# Patient Record
Sex: FEMALE | Race: WHITE
[De-identification: names, ages, dates, MRNs, and addresses within clinical notes are randomized per-mention and may not be internally consistent; named-entity substitution may affect disease eponyms.]

---

## 2018-01-25 ENCOUNTER — HOSPITAL ENCOUNTER (EMERGENCY)
Dept: HOSPITAL 96 - M.ERS | Age: 28
Discharge: HOME | End: 2018-01-25
Payer: COMMERCIAL

## 2018-01-25 VITALS — HEIGHT: 67 IN | BODY MASS INDEX: 28.25 KG/M2 | WEIGHT: 180 LBS

## 2018-01-25 VITALS — SYSTOLIC BLOOD PRESSURE: 126 MMHG | DIASTOLIC BLOOD PRESSURE: 84 MMHG

## 2018-01-25 DIAGNOSIS — F17.210: ICD-10-CM

## 2018-01-25 DIAGNOSIS — Z90.89: ICD-10-CM

## 2018-01-25 DIAGNOSIS — Z91.040: ICD-10-CM

## 2018-01-25 DIAGNOSIS — Z90.49: ICD-10-CM

## 2018-01-25 DIAGNOSIS — R10.2: Primary | ICD-10-CM

## 2018-01-25 DIAGNOSIS — J45.909: ICD-10-CM

## 2018-01-25 DIAGNOSIS — N80.9: ICD-10-CM

## 2018-01-25 LAB
ABSOLUTE BASOPHILS: 0 THOU/UL (ref 0–0.2)
ABSOLUTE EOSINOPHILS: 0.2 THOU/UL (ref 0–0.7)
ABSOLUTE MONOCYTES: 0.5 THOU/UL (ref 0–1.2)
ALBUMIN SERPL-MCNC: 3.7 G/DL (ref 3.4–5)
ALP SERPL-CCNC: 77 U/L (ref 46–116)
ALT SERPL-CCNC: 22 U/L (ref 30–65)
ANION GAP SERPL CALC-SCNC: 4 MMOL/L (ref 7–16)
AST SERPL-CCNC: 14 U/L (ref 15–37)
BASOPHILS NFR BLD AUTO: 0.8 %
BILIRUB SERPL-MCNC: 0.1 MG/DL
BILIRUB UR-MCNC: NEGATIVE MG/DL
BUN SERPL-MCNC: 14 MG/DL (ref 7–18)
CALCIUM SERPL-MCNC: 9.3 MG/DL (ref 8.5–10.1)
CHLORIDE SERPL-SCNC: 106 MMOL/L (ref 98–107)
CO2 SERPL-SCNC: 30 MMOL/L (ref 21–32)
COLOR UR: YELLOW
CREAT SERPL-MCNC: 0.8 MG/DL (ref 0.6–1.3)
EOSINOPHIL NFR BLD: 4 %
GLUCOSE SERPL-MCNC: 102 MG/DL (ref 70–99)
GRANULOCYTES NFR BLD MANUAL: 42.8 %
HCT VFR BLD CALC: 42.7 % (ref 37–47)
HGB BLD-MCNC: 14.3 GM/DL (ref 12–15)
KETONES UR STRIP-MCNC: NEGATIVE MG/DL
LIPASE: 162 U/L (ref 73–393)
LYMPHOCYTES # BLD: 2.5 THOU/UL (ref 0.8–5.3)
LYMPHOCYTES NFR BLD AUTO: 43.3 %
MCH RBC QN AUTO: 30.3 PG (ref 26–34)
MCHC RBC AUTO-ENTMCNC: 33.5 G/DL (ref 28–37)
MCV RBC: 90.5 FL (ref 80–100)
MONOCYTES NFR BLD: 9.1 %
MPV: 10.7 FL. (ref 7.2–11.1)
NEUTROPHILS # BLD: 2.5 THOU/UL (ref 1.6–8.1)
NITRITE UR QL STRIP: NEGATIVE
NUCLEATED RBCS: 0 /100WBC
PLATELET COUNT*: 192 THOU/UL (ref 150–400)
POTASSIUM SERPL-SCNC: 3.8 MMOL/L (ref 3.5–5.1)
PROT SERPL-MCNC: 6.9 G/DL (ref 6.4–8.2)
PROT UR QL STRIP: NEGATIVE
RBC # BLD AUTO: 4.72 MIL/UL (ref 4.2–5)
RBC # UR STRIP: (no result) /UL
RBC #/AREA URNS HPF: (no result) /HPF (ref 0–2)
RDW-CV: 13.2 % (ref 10.5–14.5)
SODIUM SERPL-SCNC: 140 MMOL/L (ref 136–145)
SP GR UR STRIP: >= 1.03 (ref 1–1.03)
SQUAMOUS: (no result) /LPF (ref 0–3)
URINE CLARITY: CLEAR
URINE GLUCOSE-RANDOM: NEGATIVE
URINE LEUKOCYTES: NEGATIVE
UROBILINOGEN UR STRIP-ACNC: 0.2 E.U./DL (ref 0.2–1)
WBC # BLD AUTO: 5.9 THOU/UL (ref 4–11)

## 2018-03-05 ENCOUNTER — HOSPITAL ENCOUNTER (EMERGENCY)
Dept: HOSPITAL 96 - M.ERS | Age: 28
Discharge: HOME | End: 2018-03-05
Payer: COMMERCIAL

## 2018-03-05 VITALS — DIASTOLIC BLOOD PRESSURE: 65 MMHG | SYSTOLIC BLOOD PRESSURE: 112 MMHG

## 2018-03-05 VITALS — WEIGHT: 180.01 LBS | BODY MASS INDEX: 28.25 KG/M2 | HEIGHT: 67 IN

## 2018-03-05 DIAGNOSIS — Z91.040: ICD-10-CM

## 2018-03-05 DIAGNOSIS — Z91.09: ICD-10-CM

## 2018-03-05 DIAGNOSIS — O26.891: Primary | ICD-10-CM

## 2018-03-05 DIAGNOSIS — R10.9: ICD-10-CM

## 2018-03-05 DIAGNOSIS — Z90.49: ICD-10-CM

## 2018-03-05 DIAGNOSIS — F41.9: ICD-10-CM

## 2018-03-05 DIAGNOSIS — R07.89: ICD-10-CM

## 2018-03-05 DIAGNOSIS — F17.210: ICD-10-CM

## 2018-03-05 DIAGNOSIS — Z3A.01: ICD-10-CM

## 2018-03-05 DIAGNOSIS — O99.341: ICD-10-CM

## 2018-03-05 LAB
ABSOLUTE BASOPHILS: 0.1 THOU/UL (ref 0–0.2)
ABSOLUTE EOSINOPHILS: 0.1 THOU/UL (ref 0–0.7)
ABSOLUTE MONOCYTES: 0.5 THOU/UL (ref 0–1.2)
ALBUMIN SERPL-MCNC: 3.8 G/DL (ref 3.4–5)
ALP SERPL-CCNC: 61 U/L (ref 46–116)
ALT SERPL-CCNC: 31 U/L (ref 30–65)
ANION GAP SERPL CALC-SCNC: 10 MMOL/L (ref 7–16)
AST SERPL-CCNC: 18 U/L (ref 15–37)
BASOPHILS NFR BLD AUTO: 0.8 %
BILIRUB SERPL-MCNC: 0.2 MG/DL
BILIRUB UR-MCNC: NEGATIVE MG/DL
BUN SERPL-MCNC: 9 MG/DL (ref 7–18)
CALCIUM SERPL-MCNC: 8.4 MG/DL (ref 8.5–10.1)
CHLORIDE SERPL-SCNC: 106 MMOL/L (ref 98–107)
CO2 SERPL-SCNC: 23 MMOL/L (ref 21–32)
COLOR UR: YELLOW
CREAT SERPL-MCNC: 0.6 MG/DL (ref 0.6–1.3)
EOSINOPHIL NFR BLD: 2 %
GLUCOSE SERPL-MCNC: 88 MG/DL (ref 70–99)
GRANULOCYTES NFR BLD MANUAL: 52.5 %
HCT VFR BLD CALC: 40.8 % (ref 37–47)
HGB BLD-MCNC: 13.6 GM/DL (ref 12–15)
KETONES UR STRIP-MCNC: (no result) MG/DL
LYMPHOCYTES # BLD: 2.4 THOU/UL (ref 0.8–5.3)
LYMPHOCYTES NFR BLD AUTO: 37.6 %
MCH RBC QN AUTO: 30.1 PG (ref 26–34)
MCHC RBC AUTO-ENTMCNC: 33.4 G/DL (ref 28–37)
MCV RBC: 90 FL (ref 80–100)
MONOCYTES NFR BLD: 7.1 %
MPV: 10.3 FL. (ref 7.2–11.1)
NEUTROPHILS # BLD: 3.4 THOU/UL (ref 1.6–8.1)
NUCLEATED RBCS: 0 /100WBC
PLATELET COUNT*: 199 THOU/UL (ref 150–400)
POTASSIUM SERPL-SCNC: 3.5 MMOL/L (ref 3.5–5.1)
PROT SERPL-MCNC: 6.7 G/DL (ref 6.4–8.2)
PROT UR QL STRIP: NEGATIVE
RBC # BLD AUTO: 4.53 MIL/UL (ref 4.2–5)
RBC # UR STRIP: NEGATIVE /UL
RDW-CV: 13.4 % (ref 10.5–14.5)
SODIUM SERPL-SCNC: 139 MMOL/L (ref 136–145)
SP GR UR STRIP: 1.02 (ref 1–1.03)
TROPONIN-I LEVEL: <0.06 NG/ML (ref ?–0.06)
URINE CLARITY: CLEAR
URINE GLUCOSE-RANDOM: NEGATIVE
URINE LEUKOCYTES-REFLEX: NEGATIVE
URINE NITRITE-REFLEX: NEGATIVE
UROBILINOGEN UR STRIP-ACNC: 0.2 E.U./DL (ref 0.2–1)
WBC # BLD AUTO: 6.5 THOU/UL (ref 4–11)

## 2019-01-09 ENCOUNTER — HOSPITAL ENCOUNTER (EMERGENCY)
Dept: HOSPITAL 96 - M.ERS | Age: 29
Discharge: HOME | End: 2019-01-09
Payer: COMMERCIAL

## 2019-01-09 VITALS — BODY MASS INDEX: 27.78 KG/M2 | WEIGHT: 177.01 LBS | HEIGHT: 67 IN

## 2019-01-09 VITALS — SYSTOLIC BLOOD PRESSURE: 135 MMHG | DIASTOLIC BLOOD PRESSURE: 88 MMHG

## 2019-01-09 DIAGNOSIS — Z91.040: ICD-10-CM

## 2019-01-09 DIAGNOSIS — F17.210: ICD-10-CM

## 2019-01-09 DIAGNOSIS — Z88.8: ICD-10-CM

## 2019-01-09 DIAGNOSIS — R42: ICD-10-CM

## 2019-01-09 DIAGNOSIS — N80.9: ICD-10-CM

## 2019-01-09 DIAGNOSIS — Z90.49: ICD-10-CM

## 2019-01-09 DIAGNOSIS — N13.2: Primary | ICD-10-CM

## 2019-01-09 LAB
ABSOLUTE BASOPHILS: 0 THOU/UL (ref 0–0.2)
ABSOLUTE EOSINOPHILS: 0.2 THOU/UL (ref 0–0.7)
ABSOLUTE MONOCYTES: 0.4 THOU/UL (ref 0–1.2)
ALBUMIN SERPL-MCNC: 3.5 G/DL (ref 3.4–5)
ALP SERPL-CCNC: 78 U/L (ref 46–116)
ALT SERPL-CCNC: 49 U/L (ref 30–65)
ANION GAP SERPL CALC-SCNC: 11 MMOL/L (ref 7–16)
AST SERPL-CCNC: 19 U/L (ref 15–37)
BASOPHILS NFR BLD AUTO: 0.5 %
BILIRUB SERPL-MCNC: 0.2 MG/DL
BILIRUB UR-MCNC: NEGATIVE MG/DL
BUN SERPL-MCNC: 10 MG/DL (ref 7–18)
CALCIUM SERPL-MCNC: 8.7 MG/DL (ref 8.5–10.1)
CHLORIDE SERPL-SCNC: 104 MMOL/L (ref 98–107)
CO2 SERPL-SCNC: 28 MMOL/L (ref 21–32)
COLOR UR: YELLOW
CREAT SERPL-MCNC: 0.8 MG/DL (ref 0.6–1.3)
EOSINOPHIL NFR BLD: 2.9 %
GLUCOSE SERPL-MCNC: 81 MG/DL (ref 70–99)
GRANULOCYTES NFR BLD MANUAL: 51 %
HCT VFR BLD CALC: 38.7 % (ref 37–47)
HGB BLD-MCNC: 13 GM/DL (ref 12–15)
KETONES UR STRIP-MCNC: NEGATIVE MG/DL
LIPASE: 106 U/L (ref 73–393)
LYMPHOCYTES # BLD: 2.1 THOU/UL (ref 0.8–5.3)
LYMPHOCYTES NFR BLD AUTO: 38.5 %
MCH RBC QN AUTO: 30.1 PG (ref 26–34)
MCHC RBC AUTO-ENTMCNC: 33.7 G/DL (ref 28–37)
MCV RBC: 89.3 FL (ref 80–100)
MONOCYTES NFR BLD: 7.1 %
MPV: 10.3 FL. (ref 7.2–11.1)
NEUTROPHILS # BLD: 2.8 THOU/UL (ref 1.6–8.1)
NUCLEATED RBCS: 0 /100WBC
PLATELET COUNT*: 204 THOU/UL (ref 150–400)
POTASSIUM SERPL-SCNC: 4.1 MMOL/L (ref 3.5–5.1)
PROT SERPL-MCNC: 6.3 G/DL (ref 6.4–8.2)
PROT UR QL STRIP: NEGATIVE
RBC # BLD AUTO: 4.33 MIL/UL (ref 4.2–5)
RBC # UR STRIP: (no result) /UL
RDW-CV: 15.2 % (ref 10.5–14.5)
SODIUM SERPL-SCNC: 143 MMOL/L (ref 136–145)
SP GR UR STRIP: 1.01 (ref 1–1.03)
SQUAMOUS: (no result) /LPF (ref 0–3)
URINE CLARITY: CLEAR
URINE GLUCOSE-RANDOM: NEGATIVE
URINE LEUKOCYTES-REFLEX: NEGATIVE
URINE NITRITE-REFLEX: NEGATIVE
UROBILINOGEN UR STRIP-ACNC: 0.2 E.U./DL (ref 0.2–1)
WBC # BLD AUTO: 5.5 THOU/UL (ref 4–11)

## 2019-01-10 NOTE — CON
[address]  [address]                    CONSULTATION                  
_______________________________________________________________________________
 
Name:       CORNELIUSANABELLE CLIFF              Room:                      [facility]  
KIERA#:  [id number]      Account #:      [id number]  
Admission:  01/09/19     Attend Phys:                         
Discharge:  01/09/19     Date of Birth:  [birth year]  
         Report #: [id number]
                                                                     [id number]  
_______________________________________________________________________________
THIS REPORT FOR:  //name//                      
 
CC: Kaye Buchanan
 
DATE OF SERVICE:  01/09/2019
 
 
REASON FOR CONSULTATION:  Right flank pain, status post ESWL.
 
HISTORY OF PRESENT ILLNESS:  The patient is a very pleasant 28-year-old female
who underwent ESWL treatment for a right proximal ureteral stone yesterday by
Dr. Moreira at [facility].  She presented to the Emergency Room today with
intractable right flank pain.  She said she almost passed out as she was making
a bottle for her 2-month-old baby due to pain.  She denies any fevers or chills.
 She has felt lightheaded with nausea, but has not vomited.  She also reports
trouble urinating.  CT scan was done in the ER, which shows an 8 mm right
proximal ureteral stone.  There is no perinephric hematoma and there are no
distal fragments.  The stone does look a little more linear, so it may be that
the stone has broken up, but it is just hard to tell.
 
PAST MEDICAL HISTORY:  Include kidney stones, ovarian cyst, endometriosis.
 
PAST SURGICAL HISTORY:  Includes cholecystectomy, tubal ligation and right
shoulder surgery as well as the recent ESWL treatment.
 
MEDICATIONS:  At home include prenatal vitamins, sertraline and Norco.
 
ALLERGIES:  ADHESIVE TAPE AND LATEX.
 
SOCIAL HISTORY:  The patient lives with her .  She smokes a pack a day. 
Denies alcohol use or current recreational drug use.
 
FAMILY HISTORY:  Noncontributory.
 
REVIEW OF SYSTEMS:  Twelve-point review of systems is performed and is negative
except as noted above in the HPI.
 
PHYSICAL EXAMINATION:
VITAL SIGNS:  Her temperature is 36.7, pulse 91, respirations 18, blood pressure
138/94, pulse ox 99% on room air.
GENERAL:  She is a well-developed, well-nourished white female, in no acute
distress.  She is alert and oriented and appears nontoxic.
HEENT:  Normocephalic, atraumatic.
RESPIRATIONS:  Unlabored.
HEART:  Regular.
 
 
 
[place] [address]                    CONSULTATION                  
_______________________________________________________________________________
 
Name:       ANABELLE SHIELDS              Room:                      [facility]#:  [id number]      Account #:      [id number]  
Admission:  01/09/19     Attend Phys:                         
Discharge:  01/09/19     Date of Birth:  [birth year]  
         Report #: [id number]
                                                                     [id number]  
_______________________________________________________________________________
ABDOMEN:  Soft, nontender, nondistended.
BACK:  She has some right CVA tenderness.
EXTREMITIES:  Without edema.
 
LABORATORY DATA:  White count is 5.5.  She has normal hemoglobin and platelet
count.  Her BMP is normal with a BUN of 10, creatinine 0.8.  Urinalysis showed
just some blood, otherwise unremarkable.  CT scan was reviewed and reveals an 8
mm right proximal obstructing stone.  She has no hematoma from the lithotripsy.
 
ASSESSMENT AND PLAN:  Right proximal 8 mm ureteral stone, status post
extracorporeal shock wave lithotripsy.  She was seen in the ER today with
intractable flank pain.  She is feeling a lot better.  She has a 2-month-old
baby at home and wants to be discharged.  Now that her pain is under control, we
are going to switch her to oxycodone instead of the hydrocodone to see if that
helps with home pain management a little better.  She does understand the return
precautions of coming back to the Emergency Room if she again has intractable
pain or fever especially.  She is going to continue to strain her urine and okay
to start on Flomax since she is pumping and dumping currently.  She does have
followup to see Dr. Vickers actually on 01/15/2019 for postop appointment.  We
did discuss if she ends up with intractable pain from this, she will probably
require ureteroscopic intervention.
 
 
 
 
 
 
 
 
 
 
 
 
 
 
 
 
 
 
 
 
 
 
 
<ELECTRONICALLY SIGNED>
                                        By:  Kimberly Hernandez MD      
01/10/19     1540
D: 01/09/19 1530_______________________________________
T: 01/10/19 0045Kimberly Hernandez MD         /nt

## 2019-01-19 ENCOUNTER — HOSPITAL ENCOUNTER (INPATIENT)
Dept: HOSPITAL 96 - M.ERS | Age: 29
LOS: 2 days | Discharge: HOME | DRG: 700 | End: 2019-01-21
Attending: INTERNAL MEDICINE | Admitting: INTERNAL MEDICINE
Payer: COMMERCIAL

## 2019-01-19 VITALS — DIASTOLIC BLOOD PRESSURE: 98 MMHG | SYSTOLIC BLOOD PRESSURE: 136 MMHG

## 2019-01-19 VITALS — WEIGHT: 177 LBS | HEIGHT: 67.01 IN | BODY MASS INDEX: 27.78 KG/M2

## 2019-01-19 DIAGNOSIS — Z91.048: ICD-10-CM

## 2019-01-19 DIAGNOSIS — Z79.899: ICD-10-CM

## 2019-01-19 DIAGNOSIS — N39.0: ICD-10-CM

## 2019-01-19 DIAGNOSIS — Z87.442: ICD-10-CM

## 2019-01-19 DIAGNOSIS — Z90.49: ICD-10-CM

## 2019-01-19 DIAGNOSIS — Z91.040: ICD-10-CM

## 2019-01-19 DIAGNOSIS — F17.210: ICD-10-CM

## 2019-01-19 DIAGNOSIS — Z88.5: ICD-10-CM

## 2019-01-19 DIAGNOSIS — J45.909: ICD-10-CM

## 2019-01-19 DIAGNOSIS — N28.89: Primary | ICD-10-CM

## 2019-01-19 LAB
ABSOLUTE BASOPHILS: 0.1 THOU/UL (ref 0–0.2)
ABSOLUTE EOSINOPHILS: 0.3 THOU/UL (ref 0–0.7)
ABSOLUTE MONOCYTES: 0.5 THOU/UL (ref 0–1.2)
ALBUMIN SERPL-MCNC: 3.5 G/DL (ref 3.4–5)
ALP SERPL-CCNC: 99 U/L (ref 46–116)
ALT SERPL-CCNC: 38 U/L (ref 30–65)
ANION GAP SERPL CALC-SCNC: 10 MMOL/L (ref 7–16)
AST SERPL-CCNC: 32 U/L (ref 15–37)
BASOPHILS NFR BLD AUTO: 0.7 %
BILIRUB SERPL-MCNC: 0.2 MG/DL
BILIRUB UR-MCNC: NEGATIVE MG/DL
BUN SERPL-MCNC: 25 MG/DL (ref 7–18)
CALCIUM SERPL-MCNC: 8.8 MG/DL (ref 8.5–10.1)
CHLORIDE SERPL-SCNC: 101 MMOL/L (ref 98–107)
CO2 SERPL-SCNC: 25 MMOL/L (ref 21–32)
COLOR UR: (no result)
CREAT SERPL-MCNC: 0.8 MG/DL (ref 0.6–1.3)
EOSINOPHIL NFR BLD: 4 %
GLUCOSE SERPL-MCNC: 108 MG/DL (ref 70–99)
GRANULOCYTES NFR BLD MANUAL: 47.3 %
HCT VFR BLD CALC: 39.3 % (ref 37–47)
HGB BLD-MCNC: 13.3 GM/DL (ref 12–15)
KETONES UR STRIP-MCNC: NEGATIVE MG/DL
LIPASE: 121 U/L (ref 73–393)
LYMPHOCYTES # BLD: 3 THOU/UL (ref 0.8–5.3)
LYMPHOCYTES NFR BLD AUTO: 41.1 %
MCH RBC QN AUTO: 29.7 PG (ref 26–34)
MCHC RBC AUTO-ENTMCNC: 33.8 G/DL (ref 28–37)
MCV RBC: 87.9 FL (ref 80–100)
MONOCYTES NFR BLD: 6.9 %
MPV: 10.5 FL. (ref 7.2–11.1)
NEUTROPHILS # BLD: 3.5 THOU/UL (ref 1.6–8.1)
NUCLEATED RBCS: 0 /100WBC
PLATELET COUNT*: 239 THOU/UL (ref 150–400)
POTASSIUM SERPL-SCNC: 4.7 MMOL/L (ref 3.5–5.1)
PROT SERPL-MCNC: 6.9 G/DL (ref 6.4–8.2)
PROT UR QL STRIP: (no result)
RBC # BLD AUTO: 4.47 MIL/UL (ref 4.2–5)
RBC # UR STRIP: (no result) /UL
RBC #/AREA URNS HPF: (no result) /HPF (ref 0–2)
RDW-CV: 14.9 % (ref 10.5–14.5)
SODIUM SERPL-SCNC: 136 MMOL/L (ref 136–145)
SP GR UR STRIP: >= 1.03 (ref 1–1.03)
SQUAMOUS: (no result) /LPF (ref 0–3)
URINE CLARITY: (no result)
URINE GLUCOSE-RANDOM: NEGATIVE
URINE LEUKOCYTES-REFLEX: (no result)
URINE NITRITE-REFLEX: NEGATIVE
URINE WBC-REFLEX: (no result) /HPF (ref 0–5)
UROBILINOGEN UR STRIP-ACNC: 0.2 E.U./DL (ref 0.2–1)
WBC # BLD AUTO: 7.4 THOU/UL (ref 4–11)

## 2019-01-20 VITALS — SYSTOLIC BLOOD PRESSURE: 125 MMHG | DIASTOLIC BLOOD PRESSURE: 84 MMHG

## 2019-01-20 VITALS — SYSTOLIC BLOOD PRESSURE: 111 MMHG | DIASTOLIC BLOOD PRESSURE: 72 MMHG

## 2019-01-20 VITALS — DIASTOLIC BLOOD PRESSURE: 85 MMHG | SYSTOLIC BLOOD PRESSURE: 136 MMHG

## 2019-01-20 VITALS — DIASTOLIC BLOOD PRESSURE: 63 MMHG | SYSTOLIC BLOOD PRESSURE: 120 MMHG

## 2019-01-20 VITALS — DIASTOLIC BLOOD PRESSURE: 58 MMHG | SYSTOLIC BLOOD PRESSURE: 118 MMHG

## 2019-01-20 NOTE — NUR
PATIENT REMAINS ALERT AND ORIENTED. PATIENT REQUESTED HYDROCODONE FOR PAIN
RELIEF. HYDROCODONE AND FLEXERIL USED FOR RIGHT FLANK PAIN. ZOFRAN X1 FOR
NAUSEA. NO EMESIS NOTED. UP AD KATLYN. EATING SMALL AMOUNTS OF MEALS. GOOD FLUID
INTAKE. IVF INFUSING AS ORDERED. PATIENT BREASTFEEDING. CALL LIGHT WITHIN
REACH. WILL CONTINUE TO MONITOR.

## 2019-01-20 NOTE — NUR
PT ARRIVED ON UNIT AT 0125 FROM ED. VITALS STABLE. ADMIT COMPLETE. IV R HAND
PATENT, INFUSING. UROLOGY CONSULTED. ON REGULAR DIET. PAIN CONTROLLED WITH
FENTANYL. PT SETTLED AND SLEEPING. UP AD KATLYN. HOURLY ROUNDING COMPLETE. CALL
LIGHT WITHIN REACH. WILL CONTINUE TO MONITITOR.

## 2019-01-21 VITALS — SYSTOLIC BLOOD PRESSURE: 111 MMHG | DIASTOLIC BLOOD PRESSURE: 68 MMHG

## 2019-01-21 LAB
ALBUMIN SERPL-MCNC: 2.9 G/DL (ref 3.4–5)
ALP SERPL-CCNC: 84 U/L (ref 46–116)
ALT SERPL-CCNC: 31 U/L (ref 30–65)
ANION GAP SERPL CALC-SCNC: 5 MMOL/L (ref 7–16)
AST SERPL-CCNC: 14 U/L (ref 15–37)
BILIRUB SERPL-MCNC: 0.1 MG/DL
BUN SERPL-MCNC: 15 MG/DL (ref 7–18)
CALCIUM SERPL-MCNC: 8.4 MG/DL (ref 8.5–10.1)
CHLORIDE SERPL-SCNC: 108 MMOL/L (ref 98–107)
CO2 SERPL-SCNC: 27 MMOL/L (ref 21–32)
CREAT SERPL-MCNC: 0.9 MG/DL (ref 0.6–1.3)
GLUCOSE SERPL-MCNC: 118 MG/DL (ref 70–99)
HCT VFR BLD CALC: 40.1 % (ref 37–47)
HGB BLD-MCNC: 13.2 GM/DL (ref 12–15)
MAGNESIUM SERPL-MCNC: 1.8 MG/DL (ref 1.8–2.4)
MCH RBC QN AUTO: 29.8 PG (ref 26–34)
MCHC RBC AUTO-ENTMCNC: 33 G/DL (ref 28–37)
MCV RBC: 90.5 FL (ref 80–100)
MPV: 10.6 FL. (ref 7.2–11.1)
PLATELET COUNT*: 182 THOU/UL (ref 150–400)
POTASSIUM SERPL-SCNC: 4 MMOL/L (ref 3.5–5.1)
PROT SERPL-MCNC: 5.7 G/DL (ref 6.4–8.2)
RBC # BLD AUTO: 4.43 MIL/UL (ref 4.2–5)
RDW-CV: 14.9 % (ref 10.5–14.5)
SODIUM SERPL-SCNC: 140 MMOL/L (ref 136–145)
WBC # BLD AUTO: 6.1 THOU/UL (ref 4–11)

## 2019-01-21 NOTE — NUR
PATIENT DISCHARGED TO HOME AT THIS TIME. IV REMOVED. SCRIPTS GIVEN. DISCHARGED
WITH . BOTH VERBALIZE UNDERSTANDING OF DC INSTRUCTIONS AND NEED FOR F/U
WITH DR. PURVIS FOR STENT REMOVAL.

## 2019-01-21 NOTE — NUR
PT REMAINED A&Ox4 THROUGHOUT SHIFT. VITALS STABLE. UP AD KATLYN. IV IN R HAND
PATENT, INFUSING. PAIN CONTROLLED WITH NORCO AND FLEXERIL. POSSIBLE DC TODAY.
HOURLY ROUNDING COMPLETE. CALL LIGHT WITHIN REACH. WILL CONTINUE TO MONITOR.

## 2019-01-21 NOTE — CON
[address]  [address]                    CONSULTATION                  
_______________________________________________________________________________
 
Name:       ALEXANDREEBONIANABELLE              Room:           [address] [place]  
.R.#:  [id number]      Account #:      [id number]  
Admission:  01/20/19     Attend Phys:    ELDER Lloyd
Discharge:               Date of Birth:  [birth year]  
         Report #: [id number]
                                                                     [id number]  
_______________________________________________________________________________
THIS REPORT FOR:  //name//                      
 
CC: Diomedes Martins
 
DATE OF SERVICE:  01/20/2019
 
 
REFERRING PHYSICIAN:  Dr. Michelle.
 
REASON FOR CONSULTATION:  Right flank pain.
 
HISTORY OF PRESENT ILLNESS:  This is a 28-year-old female who underwent
outpatient ureteroscopic stone manipulation and stent placement by Dr. Vickers 4
days ago.  She reports that since then, she has been having problems with
ongoing right-sided flank pain radiating to the right lower quadrant as well as
hematuria, frequency, urgency and nausea.  Denies fever or chills.  Denies
difficulty emptying or passage of clots.  The pain became unremitting and she
presented to the Emergency Department.  She was admitted for pain control and
Urology was consulted for further management.
 
PAST MEDICAL HISTORY:  As above.  She also reports recent shockwave lithotripsy
by Dr. Moreira prior to the ureteroscopy procedure.  Also includes
endometriosis, ovarian cyst, and asthma.
 
PAST SURGICAL HISTORY:  As above.  Also, has a history of cholecystectomy and
shoulder surgery.
 
MEDICATIONS:  List is reviewed.  She was on Percocet at home.
 
ALLERGIES:  INCLUDE LATEX AND MORPHINE.
 
FAMILY HISTORY:  She does not know of any family history of renal disease.
 
SOCIAL HISTORY:  She denies alcohol or recreational drug use.  Smokes cigarettes
daily.
 
REVIEW OF SYSTEMS:  As per the history of present illness.  She denies chest
pain, shortness of breath or palpitations.  She has had some loose stools.  No
blood in the stool.  No numbness or weakness.  No cough.
 
PHYSICAL EXAMINATION:
VITAL SIGNS:  Temperature 36.4, pulse 70, respirations 18, blood pressure
125/84.
GENERAL:  This is a 28-year-old female in no acute distress.  She is awake,
alert, answers questions appropriately.
 
 
 
[place] [address]                    CONSULTATION                  
_______________________________________________________________________________
 
Name:       ANABELLE SHIELDS              Room:           [address] [place]  
[facility]#:  [id number]      Account #:      [id number]  
Admission:  01/20/19     Attend Phys:    ELDER Lloyd
Discharge:               Date of Birth:  [birth year]  
         Report #: [id number]
                                                                     [id number]  
_______________________________________________________________________________
HEENT:  Normocephalic, atraumatic.  Extraocular movements are intact. 
Oropharynx is clear.
NECK:  Supple.  No JVD.
LUNGS:  Respiratory effort and excursion are normal.
CARDIAC:  Rhythm is regular.  Radial pulses are palpable.
EXTREMITIES:  Warm.  Moves all extremities well.  No peripheral edema.
ABDOMEN:  Soft and nondistended.  Bladder is nonpalpable and nontender.  She has
tenderness in the right lower quadrant, right upper quadrant and right
flank/CVA.
EXTREMITIES:  No spine tenderness.  No left-sided flank tenderness.
PELVIC:  Deferred.
 
LABORATORY STUDIES:  Include a urinalysis consistent with contaminant.  Culture
is pending on that.  White count 7.4, hemoglobin 13.3, platelet count 239,000. 
Sodium 136, potassium 4.7, chloride 101, CO2 of 25, BUN 25, creatinine 0.8,
glucose 108.  Urine pregnancy test negative.  Contrast CT scan shows good
right-sided stent position and no definite urinary tract calculi.  There is a
thickening of the periureteral tissues, which is nonspecific and not unexpected
given her recent stones and surgeries.  Findings were discussed with the
patient.
 
IMPRESSION:  Flank and pelvic pain after lithotripsy and ureteral stent
placement.  Likely stent discomfort.  She is on empiric Rocephin for possible
urinary tract infection pending culture results.  Adjustments are being made to
her medication by the primary service to try to get her pain under better
control for potential outpatient management.  We discussed the continued need
for the stent at this time.  We also discussed limited options for medications
for control of stent pain given that she is currently breastfeeding.  We will
follow along and await culture results.
 
 
 
 
 
 
 
 
 
 
 
 
 
 
 
<ELECTRONICALLY SIGNED>
                                        By:  Niko Ashley MD             
01/21/19     0855
D: 01/20/19 1038_______________________________________
T: 01/20/19 1431Jogill Ashley MD                /nt

## 2019-01-25 ENCOUNTER — HOSPITAL ENCOUNTER (EMERGENCY)
Dept: HOSPITAL 96 - M.ERS | Age: 29
Discharge: HOME | End: 2019-01-25
Payer: COMMERCIAL

## 2019-01-25 VITALS — WEIGHT: 178 LBS | BODY MASS INDEX: 27.94 KG/M2 | HEIGHT: 67 IN

## 2019-01-25 VITALS — SYSTOLIC BLOOD PRESSURE: 107 MMHG | DIASTOLIC BLOOD PRESSURE: 69 MMHG

## 2019-01-25 DIAGNOSIS — N80.9: ICD-10-CM

## 2019-01-25 DIAGNOSIS — Z91.048: ICD-10-CM

## 2019-01-25 DIAGNOSIS — Z91.040: ICD-10-CM

## 2019-01-25 DIAGNOSIS — N13.4: Primary | ICD-10-CM

## 2019-01-25 DIAGNOSIS — F17.210: ICD-10-CM

## 2019-01-25 DIAGNOSIS — Z88.5: ICD-10-CM

## 2019-01-25 DIAGNOSIS — Z90.49: ICD-10-CM

## 2019-01-25 LAB
ABSOLUTE BASOPHILS: 0 THOU/UL (ref 0–0.2)
ABSOLUTE EOSINOPHILS: 0.2 THOU/UL (ref 0–0.7)
ABSOLUTE MONOCYTES: 0.6 THOU/UL (ref 0–1.2)
ANION GAP SERPL CALC-SCNC: 7 MMOL/L (ref 7–16)
BASOPHILS NFR BLD AUTO: 0.5 %
BILIRUB UR-MCNC: NEGATIVE MG/DL
BUN SERPL-MCNC: 16 MG/DL (ref 7–18)
CALCIUM SERPL-MCNC: 9.1 MG/DL (ref 8.5–10.1)
CHLORIDE SERPL-SCNC: 102 MMOL/L (ref 98–107)
CO2 SERPL-SCNC: 28 MMOL/L (ref 21–32)
COLOR UR: YELLOW
CREAT SERPL-MCNC: 0.8 MG/DL (ref 0.6–1.3)
EOSINOPHIL NFR BLD: 3.6 %
GLUCOSE SERPL-MCNC: 91 MG/DL (ref 70–99)
GRANULOCYTES NFR BLD MANUAL: 44.2 %
HCT VFR BLD CALC: 41.7 % (ref 37–47)
HGB BLD-MCNC: 13.8 GM/DL (ref 12–15)
KETONES UR STRIP-MCNC: NEGATIVE MG/DL
LYMPHOCYTES # BLD: 2.6 THOU/UL (ref 0.8–5.3)
LYMPHOCYTES NFR BLD AUTO: 41.3 %
MCH RBC QN AUTO: 29.8 PG (ref 26–34)
MCHC RBC AUTO-ENTMCNC: 33.1 G/DL (ref 28–37)
MCV RBC: 89.9 FL (ref 80–100)
MONOCYTES NFR BLD: 10.4 %
MPV: 10.2 FL. (ref 7.2–11.1)
MUCUS: (no result) STRN/LPF
NEUTROPHILS # BLD: 2.7 THOU/UL (ref 1.6–8.1)
NUCLEATED RBCS: 0 /100WBC
PLATELET COUNT*: 238 THOU/UL (ref 150–400)
POTASSIUM SERPL-SCNC: 3.9 MMOL/L (ref 3.5–5.1)
PROT UR QL STRIP: (no result)
RBC # BLD AUTO: 4.64 MIL/UL (ref 4.2–5)
RBC # UR STRIP: (no result) /UL
RBC #/AREA URNS HPF: (no result) /HPF (ref 0–2)
RDW-CV: 15 % (ref 10.5–14.5)
SODIUM SERPL-SCNC: 137 MMOL/L (ref 136–145)
SP GR UR STRIP: 1.02 (ref 1–1.03)
SQUAMOUS: (no result) /LPF (ref 0–3)
URINE CLARITY: (no result)
URINE GLUCOSE-RANDOM: NEGATIVE
URINE LEUKOCYTES-REFLEX: NEGATIVE
URINE NITRITE-REFLEX: NEGATIVE
URINE WBC-REFLEX: (no result) /HPF (ref 0–5)
UROBILINOGEN UR STRIP-ACNC: 0.2 E.U./DL (ref 0.2–1)
WBC # BLD AUTO: 6.2 THOU/UL (ref 4–11)

## 2019-09-15 ENCOUNTER — HOSPITAL ENCOUNTER (EMERGENCY)
Dept: HOSPITAL 96 - M.ERS | Age: 29
Discharge: HOME | End: 2019-09-15
Payer: COMMERCIAL

## 2019-09-15 VITALS — SYSTOLIC BLOOD PRESSURE: 135 MMHG | DIASTOLIC BLOOD PRESSURE: 80 MMHG

## 2019-09-15 VITALS — HEIGHT: 67 IN | WEIGHT: 180.01 LBS | BODY MASS INDEX: 28.25 KG/M2

## 2019-09-15 DIAGNOSIS — J43.9: ICD-10-CM

## 2019-09-15 DIAGNOSIS — Z91.048: ICD-10-CM

## 2019-09-15 DIAGNOSIS — N80.9: ICD-10-CM

## 2019-09-15 DIAGNOSIS — F17.210: ICD-10-CM

## 2019-09-15 DIAGNOSIS — Z91.040: ICD-10-CM

## 2019-09-15 DIAGNOSIS — Z88.5: ICD-10-CM

## 2019-09-15 DIAGNOSIS — Z90.89: ICD-10-CM

## 2019-09-15 DIAGNOSIS — Z90.49: ICD-10-CM

## 2019-09-15 DIAGNOSIS — R51: Primary | ICD-10-CM

## 2019-09-15 DIAGNOSIS — Z86.73: ICD-10-CM

## 2019-09-15 LAB
ABSOLUTE BASOPHILS: 0 THOU/UL (ref 0–0.2)
ABSOLUTE EOSINOPHILS: 0.1 THOU/UL (ref 0–0.7)
ABSOLUTE MONOCYTES: 0.3 THOU/UL (ref 0–1.2)
ALBUMIN SERPL-MCNC: 4 G/DL (ref 3.4–5)
ALP SERPL-CCNC: 81 U/L (ref 46–116)
ALT SERPL-CCNC: 32 U/L (ref 30–65)
ANION GAP SERPL CALC-SCNC: 11 MMOL/L (ref 7–16)
APTT BLD: 26.7 SECONDS (ref 25–31.3)
AST SERPL-CCNC: 16 U/L (ref 15–37)
BASOPHILS NFR BLD AUTO: 0.6 %
BILIRUB SERPL-MCNC: 0.2 MG/DL
BUN SERPL-MCNC: 14 MG/DL (ref 7–18)
CALCIUM SERPL-MCNC: 8.5 MG/DL (ref 8.5–10.1)
CHLORIDE SERPL-SCNC: 104 MMOL/L (ref 98–107)
CO2 SERPL-SCNC: 24 MMOL/L (ref 21–32)
CREAT SERPL-MCNC: 0.7 MG/DL (ref 0.6–1.3)
EOSINOPHIL NFR BLD: 2.5 %
GLUCOSE SERPL-MCNC: 105 MG/DL (ref 70–99)
GRANULOCYTES NFR BLD MANUAL: 55.1 %
HCT VFR BLD CALC: 42.5 % (ref 37–47)
HGB BLD-MCNC: 14.5 GM/DL (ref 12–15)
INR PPP: 1
LYMPHOCYTES # BLD: 1.9 THOU/UL (ref 0.8–5.3)
LYMPHOCYTES NFR BLD AUTO: 35.8 %
MCH RBC QN AUTO: 30.4 PG (ref 26–34)
MCHC RBC AUTO-ENTMCNC: 34 G/DL (ref 28–37)
MCV RBC: 89.2 FL (ref 80–100)
MONOCYTES NFR BLD: 6 %
MPV: 10.9 FL. (ref 7.2–11.1)
NEUTROPHILS # BLD: 2.8 THOU/UL (ref 1.6–8.1)
NUCLEATED RBCS: 0 /100WBC
PLATELET COUNT*: 220 THOU/UL (ref 150–400)
POTASSIUM SERPL-SCNC: 4 MMOL/L (ref 3.5–5.1)
PROT SERPL-MCNC: 7 G/DL (ref 6.4–8.2)
PROTHROMBIN TIME: 10.1 SECONDS (ref 9.2–11.5)
RBC # BLD AUTO: 4.76 MIL/UL (ref 4.2–5)
RDW-CV: 13.7 % (ref 10.5–14.5)
SODIUM SERPL-SCNC: 139 MMOL/L (ref 136–145)
TROPONIN-I LEVEL: <0.06 NG/ML (ref ?–0.06)
WBC # BLD AUTO: 5.2 THOU/UL (ref 4–11)

## 2019-09-16 ENCOUNTER — HOSPITAL ENCOUNTER (EMERGENCY)
Dept: HOSPITAL 96 - M.ERS | Age: 29
Discharge: HOME | End: 2019-09-16
Payer: COMMERCIAL

## 2019-09-16 VITALS — SYSTOLIC BLOOD PRESSURE: 126 MMHG | DIASTOLIC BLOOD PRESSURE: 89 MMHG

## 2019-09-16 VITALS — BODY MASS INDEX: 30.45 KG/M2 | WEIGHT: 194.01 LBS | HEIGHT: 67 IN
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
## 2019-09-16 DIAGNOSIS — Z98.51: ICD-10-CM

## 2019-09-16 DIAGNOSIS — Z91.040: ICD-10-CM

## 2019-09-16 DIAGNOSIS — Z88.5: ICD-10-CM
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
## 2019-09-16 DIAGNOSIS — Z90.49: ICD-10-CM

## 2019-09-16 DIAGNOSIS — E78.5: ICD-10-CM

## 2019-09-16 DIAGNOSIS — Z86.73: ICD-10-CM

## 2019-09-16 DIAGNOSIS — F17.210: ICD-10-CM

## 2019-09-16 DIAGNOSIS — N80.9: ICD-10-CM

## 2019-09-16 DIAGNOSIS — Z88.8: ICD-10-CM

## 2019-09-16 DIAGNOSIS — S06.0X0A: Primary | ICD-10-CM
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
## 2019-09-16 DIAGNOSIS — I10: ICD-10-CM

## 2019-09-16 LAB
ABSOLUTE BASOPHILS: 0.1 THOU/UL (ref 0–0.2)
ABSOLUTE EOSINOPHILS: 0.1 THOU/UL (ref 0–0.7)
ABSOLUTE MONOCYTES: 0.5 THOU/UL (ref 0–1.2)
ALBUMIN SERPL-MCNC: 3.9 G/DL (ref 3.4–5)
ALP SERPL-CCNC: 89 U/L (ref 46–116)
ALT SERPL-CCNC: 28 U/L (ref 30–65)
ANION GAP SERPL CALC-SCNC: 11 MMOL/L (ref 7–16)
AST SERPL-CCNC: 15 U/L (ref 15–37)
BASOPHILS NFR BLD AUTO: 0.8 %
BILIRUB SERPL-MCNC: 0.1 MG/DL
BUN SERPL-MCNC: 12 MG/DL (ref 7–18)
CALCIUM SERPL-MCNC: 8.7 MG/DL (ref 8.5–10.1)
CHLORIDE SERPL-SCNC: 105 MMOL/L (ref 98–107)
CO2 SERPL-SCNC: 25 MMOL/L (ref 21–32)
CREAT SERPL-MCNC: 0.7 MG/DL (ref 0.6–1.3)
EOSINOPHIL NFR BLD: 2.2 %
GLUCOSE SERPL-MCNC: 95 MG/DL (ref 70–99)
GRANULOCYTES NFR BLD MANUAL: 59.2 %
HCT VFR BLD CALC: 40.8 % (ref 37–47)
HGB BLD-MCNC: 13.9 GM/DL (ref 12–15)
LYMPHOCYTES # BLD: 2 THOU/UL (ref 0.8–5.3)
LYMPHOCYTES NFR BLD AUTO: 30.3 %
MCH RBC QN AUTO: 30.1 PG (ref 26–34)
MCHC RBC AUTO-ENTMCNC: 34 G/DL (ref 28–37)
MCV RBC: 88.6 FL (ref 80–100)
MONOCYTES NFR BLD: 7.5 %
MPV: 11.1 FL. (ref 7.2–11.1)
NEUTROPHILS # BLD: 3.9 THOU/UL (ref 1.6–8.1)
NUCLEATED RBCS: 0 /100WBC
PLATELET COUNT*: 211 THOU/UL (ref 150–400)
POTASSIUM SERPL-SCNC: 3.8 MMOL/L (ref 3.5–5.1)
PROT SERPL-MCNC: 7 G/DL (ref 6.4–8.2)
RBC # BLD AUTO: 4.61 MIL/UL (ref 4.2–5)
RDW-CV: 13.5 % (ref 10.5–14.5)
SODIUM SERPL-SCNC: 141 MMOL/L (ref 136–145)
TROPONIN-I LEVEL: <0.06 NG/ML (ref ?–0.06)
WBC # BLD AUTO: 6.6 THOU/UL (ref 4–11)

## 2019-09-16 NOTE — EKG
[place] [address]
Phone:  [phone]                     ELECTROCARDIOGRAM REPORT      
_______________________________________________________________________________
 
Name:       PARAMJITFORRESTPHILLIP LYNANABELLE L              Room:                      [facility]#:  [id number]      Account #:      [id number]  
Admission:  09/15/19     Attend Phys:                         
Discharge:  09/15/19     Date of Birth:  [birth year]  
         Report #: [id number]
    [id number]
_______________________________________________________________________________
THIS REPORT FOR:  //name//                      
 
                         [facility] ED
                                       
Test Date:    2019-09-15               Test Time:    13:33:36
Pat Name:     ANABELLE SHIELDS          Department:   
Patient ID:   [id number]            Room:          
Gender:       F                        Technician:   LEATHA
:          1990               Requested By: Jess Lester
Order Number: [id number] MD:   Easton Castaneda
                                 Measurements
Intervals                              Axis          
Rate:         69                       P:            41
NH:           155                      QRS:          68
QRSD:         104                      T:            22
QT:           405                                    
QTc:          434                                    
                           Interpretive Statements
Sinus rhythm
Low voltage, precordial leads
Compared to ECG 2018 15:32:14
Low QRS voltage now present
T-wave abnormality no longer present
 
Electronically Signed On 2019 17:06:17 CDT by Easton Castaneda
[email or web address]
 
 
 
 
 
 
 
 
 
 
 
 
 
 
 
 
 
  <ELECTRONICALLY SIGNED>
                                           By: Easton Castaneda MD, [facility]      
  19     6386
D: 09/15/19 1333   _____________________________________
T: 09/15/19 1333   Easton Castaneda MD, [facility]        /EPI

## 2019-09-17 NOTE — EKG
[place] [address]
Phone:  [phone]                     ELECTROCARDIOGRAM REPORT      
_______________________________________________________________________________
 
Name:       ANABELLE SHIELDS CLIFF              Room:                      [facility]#:  [id number]      Account #:      [id number]  
Admission:  19     Attend Phys:                         
Discharge:  19     Date of Birth:  [birth year]  
         Report #: [id number]
    [id number]
_______________________________________________________________________________
THIS REPORT FOR:  //name//                      
 
                         [facility] ED
                                       
Test Date:    2019               Test Time:    18:59:00
Pat Name:     ANABELLE SHIELDS          Department:   
Patient ID:   [id number]            Room:          
Gender:       F                        Technician:   FL
:          1990               Requested By: Michael Zarco
Order Number: [id number] MD:   Johnathon Avery
                                 Measurements
Intervals                              Axis          
Rate:         82                       P:            43
AL:           153                      QRS:          38
QRSD:         100                      T:            15
QT:           389                                    
QTc:          455                                    
                           Interpretive Statements
Sinus rhythm
Compared to ECG 09/15/2019 13:33:36
No significant changes
 
Electronically Signed On 2019 17:18:37 CDT by Johnathon Avery
[email or web address]
 
 
 
 
 
 
 
 
 
 
 
 
 
 
 
 
 
 
 
  <ELECTRONICALLY SIGNED>
                                           By: Johnathon Avery MD, [facility]   
  19     1718
D: 19 1859   _____________________________________
T: 19 1859   Johnathon Avery MD, FACC     /EPI

## 2019-10-09 ENCOUNTER — HOSPITAL ENCOUNTER (EMERGENCY)
Dept: HOSPITAL 96 - M.ERS | Age: 29
Discharge: HOME | End: 2019-10-09
Payer: COMMERCIAL

## 2019-10-09 VITALS — WEIGHT: 183.01 LBS | HEIGHT: 67 IN | BODY MASS INDEX: 28.72 KG/M2

## 2019-10-09 VITALS — DIASTOLIC BLOOD PRESSURE: 80 MMHG | SYSTOLIC BLOOD PRESSURE: 125 MMHG

## 2019-10-09 DIAGNOSIS — I10: ICD-10-CM

## 2019-10-09 DIAGNOSIS — Z88.5: ICD-10-CM

## 2019-10-09 DIAGNOSIS — Z90.49: ICD-10-CM

## 2019-10-09 DIAGNOSIS — Z86.73: ICD-10-CM

## 2019-10-09 DIAGNOSIS — Z91.040: ICD-10-CM

## 2019-10-09 DIAGNOSIS — N80.9: ICD-10-CM

## 2019-10-09 DIAGNOSIS — E78.5: ICD-10-CM

## 2019-10-09 DIAGNOSIS — Z98.51: ICD-10-CM

## 2019-10-09 DIAGNOSIS — F17.210: ICD-10-CM

## 2019-10-09 DIAGNOSIS — Z98.890: ICD-10-CM

## 2019-10-09 DIAGNOSIS — Z88.8: ICD-10-CM

## 2019-10-09 DIAGNOSIS — H53.2: Primary | ICD-10-CM

## 2019-11-06 ENCOUNTER — HOSPITAL ENCOUNTER (EMERGENCY)
Dept: HOSPITAL 96 - M.ERS | Age: 29
Discharge: HOME | End: 2019-11-06
Payer: COMMERCIAL

## 2019-11-06 VITALS — WEIGHT: 180.01 LBS | BODY MASS INDEX: 28.25 KG/M2 | HEIGHT: 67 IN

## 2019-11-06 VITALS — DIASTOLIC BLOOD PRESSURE: 62 MMHG | SYSTOLIC BLOOD PRESSURE: 110 MMHG

## 2019-11-06 DIAGNOSIS — Z88.5: ICD-10-CM

## 2019-11-06 DIAGNOSIS — Z88.8: ICD-10-CM

## 2019-11-06 DIAGNOSIS — Z90.49: ICD-10-CM

## 2019-11-06 DIAGNOSIS — R11.2: ICD-10-CM

## 2019-11-06 DIAGNOSIS — R10.31: Primary | ICD-10-CM

## 2019-11-06 DIAGNOSIS — Z98.51: ICD-10-CM

## 2019-11-06 DIAGNOSIS — F17.210: ICD-10-CM

## 2019-11-06 DIAGNOSIS — E78.5: ICD-10-CM

## 2019-11-06 DIAGNOSIS — Z91.040: ICD-10-CM

## 2019-11-06 DIAGNOSIS — N80.9: ICD-10-CM

## 2019-11-06 DIAGNOSIS — Z98.890: ICD-10-CM

## 2019-11-06 DIAGNOSIS — I10: ICD-10-CM

## 2019-11-06 DIAGNOSIS — Z86.73: ICD-10-CM

## 2019-11-06 LAB
ABSOLUTE BASOPHILS: 0 THOU/UL (ref 0–0.2)
ABSOLUTE EOSINOPHILS: 0.1 THOU/UL (ref 0–0.7)
ABSOLUTE MONOCYTES: 0.4 THOU/UL (ref 0–1.2)
ALBUMIN SERPL-MCNC: 3.3 G/DL (ref 3.4–5)
ALP SERPL-CCNC: 79 U/L (ref 46–116)
ALT SERPL-CCNC: 25 U/L (ref 30–65)
ANION GAP SERPL CALC-SCNC: 10 MMOL/L (ref 7–16)
AST SERPL-CCNC: 10 U/L (ref 15–37)
BASOPHILS NFR BLD AUTO: 0.8 %
BILIRUB SERPL-MCNC: < 0.1 MG/DL
BILIRUB UR-MCNC: NEGATIVE MG/DL
BUN SERPL-MCNC: 20 MG/DL (ref 7–18)
CALCIUM SERPL-MCNC: 8.1 MG/DL (ref 8.5–10.1)
CHLORIDE SERPL-SCNC: 109 MMOL/L (ref 98–107)
CO2 SERPL-SCNC: 23 MMOL/L (ref 21–32)
COLOR UR: YELLOW
CREAT SERPL-MCNC: 0.8 MG/DL (ref 0.6–1.3)
EOSINOPHIL NFR BLD: 2.2 %
GLUCOSE SERPL-MCNC: 102 MG/DL (ref 70–99)
GRANULOCYTES NFR BLD MANUAL: 49.3 %
HCT VFR BLD CALC: 38.1 % (ref 37–47)
HGB BLD-MCNC: 13.1 GM/DL (ref 12–15)
KETONES UR STRIP-MCNC: NEGATIVE MG/DL
LIPASE: 132 U/L (ref 73–393)
LYMPHOCYTES # BLD: 2.2 THOU/UL (ref 0.8–5.3)
LYMPHOCYTES NFR BLD AUTO: 40.3 %
MCH RBC QN AUTO: 30.5 PG (ref 26–34)
MCHC RBC AUTO-ENTMCNC: 34.3 G/DL (ref 28–37)
MCV RBC: 88.8 FL (ref 80–100)
MONOCYTES NFR BLD: 7.4 %
MPV: 10.9 FL. (ref 7.2–11.1)
NEUTROPHILS # BLD: 2.7 THOU/UL (ref 1.6–8.1)
NUCLEATED RBCS: 0 /100WBC
PLATELET COUNT*: 192 THOU/UL (ref 150–400)
POTASSIUM SERPL-SCNC: 3.8 MMOL/L (ref 3.5–5.1)
PROT SERPL-MCNC: 6.2 G/DL (ref 6.4–8.2)
PROT UR QL STRIP: NEGATIVE
RBC # BLD AUTO: 4.29 MIL/UL (ref 4.2–5)
RBC # UR STRIP: NEGATIVE /UL
RDW-CV: 13.2 % (ref 10.5–14.5)
SODIUM SERPL-SCNC: 142 MMOL/L (ref 136–145)
SP GR UR STRIP: 1.02 (ref 1–1.03)
URINE CLARITY: CLEAR
URINE GLUCOSE-RANDOM: NEGATIVE
URINE LEUKOCYTES-REFLEX: NEGATIVE
URINE NITRITE-REFLEX: NEGATIVE
UROBILINOGEN UR STRIP-ACNC: 0.2 E.U./DL (ref 0.2–1)
WBC # BLD AUTO: 5.4 THOU/UL (ref 4–11)

## 2019-12-06 ENCOUNTER — HOSPITAL ENCOUNTER (EMERGENCY)
Dept: HOSPITAL 96 - M.ERS | Age: 29
Discharge: HOME | End: 2019-12-06
Payer: COMMERCIAL

## 2019-12-06 VITALS — BODY MASS INDEX: 29.82 KG/M2 | HEIGHT: 67 IN | WEIGHT: 189.99 LBS

## 2019-12-06 VITALS — SYSTOLIC BLOOD PRESSURE: 123 MMHG | DIASTOLIC BLOOD PRESSURE: 96 MMHG

## 2019-12-06 DIAGNOSIS — F17.210: ICD-10-CM

## 2019-12-06 DIAGNOSIS — Z90.710: ICD-10-CM

## 2019-12-06 DIAGNOSIS — N80.9: ICD-10-CM

## 2019-12-06 DIAGNOSIS — Z90.49: ICD-10-CM

## 2019-12-06 DIAGNOSIS — I10: ICD-10-CM

## 2019-12-06 DIAGNOSIS — M25.561: Primary | ICD-10-CM

## 2019-12-06 DIAGNOSIS — Z98.51: ICD-10-CM

## 2019-12-06 DIAGNOSIS — Z98.890: ICD-10-CM

## 2019-12-06 DIAGNOSIS — Z86.73: ICD-10-CM

## 2019-12-06 DIAGNOSIS — Z91.040: ICD-10-CM

## 2019-12-06 DIAGNOSIS — E78.5: ICD-10-CM

## 2019-12-06 DIAGNOSIS — Z88.5: ICD-10-CM

## 2019-12-06 DIAGNOSIS — Z88.8: ICD-10-CM

## 2020-01-29 ENCOUNTER — HOSPITAL ENCOUNTER (EMERGENCY)
Dept: HOSPITAL 96 - M.ERS | Age: 30
Discharge: HOME | End: 2020-01-29
Payer: COMMERCIAL

## 2020-01-29 VITALS — WEIGHT: 180.01 LBS | HEIGHT: 68 IN | BODY MASS INDEX: 27.28 KG/M2

## 2020-01-29 VITALS — SYSTOLIC BLOOD PRESSURE: 146 MMHG | DIASTOLIC BLOOD PRESSURE: 69 MMHG

## 2020-01-29 DIAGNOSIS — Z91.018: ICD-10-CM

## 2020-01-29 DIAGNOSIS — Z90.710: ICD-10-CM

## 2020-01-29 DIAGNOSIS — Z90.49: ICD-10-CM

## 2020-01-29 DIAGNOSIS — Z86.73: ICD-10-CM

## 2020-01-29 DIAGNOSIS — N80.9: ICD-10-CM

## 2020-01-29 DIAGNOSIS — Z91.040: ICD-10-CM

## 2020-01-29 DIAGNOSIS — E78.5: ICD-10-CM

## 2020-01-29 DIAGNOSIS — Z88.5: ICD-10-CM

## 2020-01-29 DIAGNOSIS — Z98.51: ICD-10-CM

## 2020-01-29 DIAGNOSIS — F17.210: ICD-10-CM

## 2020-01-29 DIAGNOSIS — Z90.89: ICD-10-CM

## 2020-01-29 DIAGNOSIS — I10: ICD-10-CM

## 2020-01-29 DIAGNOSIS — M25.561: Primary | ICD-10-CM

## 2020-01-29 DIAGNOSIS — Z88.8: ICD-10-CM

## 2020-04-27 ENCOUNTER — HOSPITAL ENCOUNTER (EMERGENCY)
Dept: HOSPITAL 96 - M.ERS | Age: 30
Discharge: HOME | End: 2020-04-27
Payer: COMMERCIAL

## 2020-04-27 VITALS — HEIGHT: 67 IN | BODY MASS INDEX: 31.39 KG/M2 | WEIGHT: 200 LBS

## 2020-04-27 VITALS — DIASTOLIC BLOOD PRESSURE: 81 MMHG | SYSTOLIC BLOOD PRESSURE: 114 MMHG

## 2020-04-27 DIAGNOSIS — Z91.048: ICD-10-CM

## 2020-04-27 DIAGNOSIS — Z91.040: ICD-10-CM

## 2020-04-27 DIAGNOSIS — Z86.73: ICD-10-CM

## 2020-04-27 DIAGNOSIS — Z88.6: ICD-10-CM

## 2020-04-27 DIAGNOSIS — Z90.710: ICD-10-CM

## 2020-04-27 DIAGNOSIS — E78.5: ICD-10-CM

## 2020-04-27 DIAGNOSIS — G89.29: ICD-10-CM

## 2020-04-27 DIAGNOSIS — Z90.49: ICD-10-CM

## 2020-04-27 DIAGNOSIS — N80.9: ICD-10-CM

## 2020-04-27 DIAGNOSIS — I10: ICD-10-CM

## 2020-04-27 DIAGNOSIS — M54.42: Primary | ICD-10-CM

## 2020-04-27 DIAGNOSIS — F17.210: ICD-10-CM

## 2020-04-27 DIAGNOSIS — Z98.51: ICD-10-CM

## 2020-05-05 ENCOUNTER — HOSPITAL ENCOUNTER (EMERGENCY)
Dept: HOSPITAL 96 - M.ERS | Age: 30
Discharge: HOME | End: 2020-05-05
Payer: COMMERCIAL

## 2020-05-05 VITALS — BODY MASS INDEX: 31.39 KG/M2 | HEIGHT: 67 IN | WEIGHT: 200 LBS

## 2020-05-05 VITALS — SYSTOLIC BLOOD PRESSURE: 130 MMHG | DIASTOLIC BLOOD PRESSURE: 70 MMHG

## 2020-05-05 DIAGNOSIS — Z90.710: ICD-10-CM

## 2020-05-05 DIAGNOSIS — Z98.51: ICD-10-CM

## 2020-05-05 DIAGNOSIS — Z91.040: ICD-10-CM

## 2020-05-05 DIAGNOSIS — F17.210: ICD-10-CM

## 2020-05-05 DIAGNOSIS — Z90.49: ICD-10-CM

## 2020-05-05 DIAGNOSIS — Z91.018: ICD-10-CM

## 2020-05-05 DIAGNOSIS — I10: ICD-10-CM

## 2020-05-05 DIAGNOSIS — Z90.89: ICD-10-CM

## 2020-05-05 DIAGNOSIS — Z86.73: ICD-10-CM

## 2020-05-05 DIAGNOSIS — M54.5: Primary | ICD-10-CM

## 2020-05-05 DIAGNOSIS — G89.29: ICD-10-CM

## 2020-05-05 DIAGNOSIS — E78.5: ICD-10-CM

## 2020-05-05 DIAGNOSIS — N80.9: ICD-10-CM

## 2020-05-05 DIAGNOSIS — Z88.5: ICD-10-CM

## 2020-11-07 ENCOUNTER — HOSPITAL ENCOUNTER (EMERGENCY)
Dept: HOSPITAL 96 - M.ERS | Age: 30
Discharge: HOME | End: 2020-11-07
Payer: COMMERCIAL

## 2020-11-07 VITALS — HEIGHT: 67 IN | BODY MASS INDEX: 29.82 KG/M2 | WEIGHT: 189.99 LBS

## 2020-11-07 VITALS — DIASTOLIC BLOOD PRESSURE: 70 MMHG | SYSTOLIC BLOOD PRESSURE: 140 MMHG

## 2020-11-07 DIAGNOSIS — Z86.73: ICD-10-CM

## 2020-11-07 DIAGNOSIS — Z90.49: ICD-10-CM
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
## 2020-11-07 DIAGNOSIS — E78.5: ICD-10-CM

## 2020-11-07 DIAGNOSIS — Z90.89: ICD-10-CM

## 2020-11-07 DIAGNOSIS — N80.9: ICD-10-CM

## 2020-11-07 DIAGNOSIS — Z91.040: ICD-10-CM

## 2020-11-07 DIAGNOSIS — M54.5: ICD-10-CM

## 2020-11-07 DIAGNOSIS — Z88.5: ICD-10-CM
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
## 2020-11-07 DIAGNOSIS — Z90.710: ICD-10-CM

## 2020-11-07 DIAGNOSIS — F17.210: ICD-10-CM

## 2020-11-07 DIAGNOSIS — Z91.018: ICD-10-CM

## 2020-11-07 DIAGNOSIS — M25.561: ICD-10-CM
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
## 2020-11-07 DIAGNOSIS — Z98.51: ICD-10-CM

## 2020-11-07 DIAGNOSIS — S93.492A: Primary | ICD-10-CM

## 2020-11-13 ENCOUNTER — HOSPITAL ENCOUNTER (EMERGENCY)
Dept: HOSPITAL 96 - M.ERS | Age: 30
Discharge: HOME | End: 2020-11-13
Payer: COMMERCIAL

## 2020-11-13 VITALS — DIASTOLIC BLOOD PRESSURE: 100 MMHG | SYSTOLIC BLOOD PRESSURE: 142 MMHG

## 2020-11-13 VITALS — BODY MASS INDEX: 29.51 KG/M2 | HEIGHT: 67 IN | WEIGHT: 188.01 LBS

## 2020-11-13 DIAGNOSIS — Z88.6: ICD-10-CM

## 2020-11-13 DIAGNOSIS — E78.5: ICD-10-CM

## 2020-11-13 DIAGNOSIS — Z91.018: ICD-10-CM

## 2020-11-13 DIAGNOSIS — Z90.49: ICD-10-CM

## 2020-11-13 DIAGNOSIS — Z79.899: ICD-10-CM

## 2020-11-13 DIAGNOSIS — F17.210: ICD-10-CM

## 2020-11-13 DIAGNOSIS — Z90.710: ICD-10-CM

## 2020-11-13 DIAGNOSIS — Z20.828: ICD-10-CM

## 2020-11-13 DIAGNOSIS — Z91.040: ICD-10-CM

## 2020-11-13 DIAGNOSIS — I10: ICD-10-CM

## 2020-11-13 DIAGNOSIS — Z98.51: ICD-10-CM

## 2020-11-13 DIAGNOSIS — B34.9: Primary | ICD-10-CM

## 2020-11-13 DIAGNOSIS — Z86.73: ICD-10-CM

## 2020-11-13 LAB
ANION GAP SERPL CALC-SCNC: 9 MMOL/L (ref 7–16)
BUN SERPL-MCNC: 12 MG/DL (ref 7–18)
CALCIUM SERPL-MCNC: 8.5 MG/DL (ref 8.5–10.1)
CHLORIDE SERPL-SCNC: 108 MMOL/L (ref 98–107)
CO2 SERPL-SCNC: 24 MMOL/L (ref 21–32)
CREAT SERPL-MCNC: 0.8 MG/DL (ref 0.6–1.3)
GLUCOSE SERPL-MCNC: 111 MG/DL (ref 70–99)
HCT VFR BLD CALC: 42.5 % (ref 37–47)
HGB BLD-MCNC: 14.3 GM/DL (ref 12–15)
MCH RBC QN AUTO: 30.2 PG (ref 26–34)
MCHC RBC AUTO-ENTMCNC: 33.5 G/DL (ref 28–37)
MCV RBC: 90.3 FL (ref 80–100)
MPV: 10.2 FL. (ref 7.2–11.1)
PLATELET COUNT*: 211 THOU/UL (ref 150–400)
POTASSIUM SERPL-SCNC: 4 MMOL/L (ref 3.5–5.1)
RBC # BLD AUTO: 4.71 MIL/UL (ref 4.2–5)
RDW-CV: 13.7 % (ref 10.5–14.5)
SODIUM SERPL-SCNC: 141 MMOL/L (ref 136–145)
WBC # BLD AUTO: 7.2 THOU/UL (ref 4–11)